# Patient Record
Sex: FEMALE | Employment: OTHER | URBAN - METROPOLITAN AREA
[De-identification: names, ages, dates, MRNs, and addresses within clinical notes are randomized per-mention and may not be internally consistent; named-entity substitution may affect disease eponyms.]

---

## 2017-01-22 NOTE — PROGRESS NOTES
[facility] Progress Note      Patient Name: Sallie Ruff   YOB: 1955  Medical Record Number: [id number]  Attending Physician: Renetta Kaur M.D. Date of Visit: 1/23/2017      Chief Complaint  Multiple myeloma - follow up. maintenance therapy with lenolidomide 5 mg daily. This dose was subsequently increased to 10 mg daily. Patient returned for unscheduled follow up in 10/2013 with new complaints of pain and swelling in the right leg.   Workup revealed DVT in right lower Rfl:         Allergies   Ms. Alexandra Mccoy is allergic to shrimp. Review of Systems   Constitutional No fevers, chills, night sweats, excessive fatigue or weight loss. Respiratory No dyspnea, cough, hemoptysis, pleuritic chest pain.   Gastrointestinal No con 01/23/17 12:40 PM   Result Value Ref Range   Immunoglobulin A 189.00 70..00 mg/dL   Immunoglobulin G 1700 (H) 791-1643 mg/dL   Immunoglobulin M 17.0 (L) 43.0-279.0 mg/dL   -MONOCLONAL PROTEIN STUDY   Collection Time: 01/23/17 12:40 PM   Result Value % 1.6 %   Immature Granulocyte % 0.3 %   -KAREN, DIRECT SCREEN   Collection Time: 01/23/17 12:40 PM   Result Value Ref Range   Karen Screen Negative Negative   -SED RATE, ANASTACIAREN (AUTOMATED)   Collection Time: 01/23/17 12:40 PM   Result Value Ref Range   Se

## 2017-01-23 ENCOUNTER — OFFICE VISIT (OUTPATIENT)
Dept: HEMATOLOGY/ONCOLOGY | Facility: HOSPITAL | Age: 62
End: 2017-01-23
Attending: SPECIALIST
Payer: COMMERCIAL

## 2017-01-23 VITALS
TEMPERATURE: 98 F | OXYGEN SATURATION: 98 % | BODY MASS INDEX: 22.02 KG/M2 | HEIGHT: 64.02 IN | SYSTOLIC BLOOD PRESSURE: 112 MMHG | WEIGHT: 129 LBS | DIASTOLIC BLOOD PRESSURE: 59 MMHG | HEART RATE: 77 BPM | RESPIRATION RATE: 18 BRPM

## 2017-01-23 DIAGNOSIS — R21 RASH: ICD-10-CM

## 2017-01-23 DIAGNOSIS — D53.9 MACROCYTIC ANEMIA: ICD-10-CM

## 2017-01-23 DIAGNOSIS — M79.89 BILATERAL HAND SWELLING: ICD-10-CM

## 2017-01-23 DIAGNOSIS — M25.541 ARTHRALGIA OF BOTH HANDS: ICD-10-CM

## 2017-01-23 DIAGNOSIS — R01.1 NEWLY RECOGNIZED HEART MURMUR: ICD-10-CM

## 2017-01-23 DIAGNOSIS — M25.542 ARTHRALGIA OF BOTH HANDS: ICD-10-CM

## 2017-01-23 DIAGNOSIS — C90.01 MULTIPLE MYELOMA IN REMISSION (HCC): Primary | ICD-10-CM

## 2017-01-23 DIAGNOSIS — I82.511 CHRONIC DEEP VEIN THROMBOSIS (DVT) OF FEMORAL VEIN OF RIGHT LOWER EXTREMITY (HCC): ICD-10-CM

## 2017-01-23 LAB
ALBUMIN SERPL-MCNC: 3.5 G/DL (ref 3.5–4.8)
ALP LIVER SERPL-CCNC: 52 U/L (ref 50–130)
ALT SERPL-CCNC: 23 U/L (ref 14–54)
AST SERPL-CCNC: 21 U/L (ref 15–41)
BASOPHILS # BLD AUTO: 0.06 X10(3) UL (ref 0–0.1)
BASOPHILS NFR BLD AUTO: 1.6 %
BILIRUB SERPL-MCNC: 1 MG/DL (ref 0.1–2)
BUN BLD-MCNC: 21 MG/DL (ref 8–20)
CALCIUM BLD-MCNC: 8.8 MG/DL (ref 8.3–10.3)
CHLORIDE: 109 MMOL/L (ref 101–111)
CO2: 26 MMOL/L (ref 22–32)
CREAT BLD-MCNC: 1.31 MG/DL (ref 0.55–1.02)
EOSINOPHIL # BLD AUTO: 0.34 X10(3) UL (ref 0–0.3)
EOSINOPHIL NFR BLD AUTO: 8.8 %
ERYTHROCYTE [DISTWIDTH] IN BLOOD BY AUTOMATED COUNT: 13.2 % (ref 11.5–16)
GLUCOSE BLD-MCNC: 91 MG/DL (ref 70–99)
HCT VFR BLD AUTO: 34.4 % (ref 34–50)
HGB BLD-MCNC: 11.5 G/DL (ref 12–16)
IMMATURE GRANULOCYTE COUNT: 0.01 X10(3) UL (ref 0–1)
IMMATURE GRANULOCYTE RATIO %: 0.3 %
IMMUNOGLOBULIN A: 189 MG/DL (ref 70–312)
IMMUNOGLOBULIN G: 1700 MG/DL (ref 791–1643)
IMMUNOGLOBULIN M: 17 MG/DL (ref 43–279)
LYMPHOCYTES # BLD AUTO: 1.95 X10(3) UL (ref 0.9–4)
LYMPHOCYTES NFR BLD AUTO: 50.6 %
M PROTEIN MFR SERPL ELPH: 7.1 G/DL (ref 6.1–8.3)
MCH RBC QN AUTO: 35.8 PG (ref 27–33.2)
MCHC RBC AUTO-ENTMCNC: 33.4 G/DL (ref 31–37)
MCV RBC AUTO: 107.2 FL (ref 81–100)
MONOCYTES # BLD AUTO: 0.47 X10(3) UL (ref 0.1–0.6)
MONOCYTES NFR BLD AUTO: 12.2 %
NEUTROPHIL ABS PRELIM: 1.02 X10 (3) UL (ref 1.3–6.7)
NEUTROPHILS # BLD AUTO: 1.02 X10(3) UL (ref 1.3–6.7)
NEUTROPHILS NFR BLD AUTO: 26.5 %
PLATELET # BLD AUTO: 161 10(3)UL (ref 150–450)
POTASSIUM SERPL-SCNC: 4.3 MMOL/L (ref 3.6–5.1)
RBC # BLD AUTO: 3.21 X10(6)UL (ref 3.8–5.1)
RED CELL DISTRIBUTION WIDTH-SD: 52.4 FL (ref 35.1–46.3)
SED RATE-ML: 35 MM/HR (ref 0–25)
SODIUM SERPL-SCNC: 141 MMOL/L (ref 136–144)
WBC # BLD AUTO: 3.9 X10(3) UL (ref 4–13)

## 2017-01-23 PROCEDURE — 99215 OFFICE O/P EST HI 40 MIN: CPT | Performed by: SPECIALIST

## 2017-01-23 NOTE — PROGRESS NOTES
Patient is here today for follow up with Nathalia Quintanilla. Patient stated pain in her hands is rated a 1 on a scale of 0-10.   Patient stated hand are swollen every morning - stopped Revlimid for one week as instructed but restarted - did not call with update - w

## 2017-01-24 LAB
A/G RATIO: 1.23
ALBUMIN, SERUM: 3.75 G/DL (ref 3.5–4.8)
ALPHA-1 GLOBULIN: 0.19 G/DL (ref 0.1–0.3)
ALPHA-2 GLOBULIN: 0.75 G/DL (ref 0.6–1)
ANA SCREEN: NEGATIVE
BETA GLOBULIN: 0.65 G/DL (ref 0.7–1.2)
GAMMA GLOBULIN: 1.47 G/DL (ref 0.6–1.6)
KAPPA FREE LIGHT CHAIN: 6.93 MG/DL (ref 0.33–1.94)
KAPPA/LAMBDA FLC RATIO: 1.93 (ref 0.26–1.65)
LAMBDA FREE LIGHT CHAIN: 3.59 MG/DL (ref 0.57–2.63)
TOTAL PROTEIN,SERUM: 6.8 G/DL (ref 6.1–8.3)

## 2017-01-26 ENCOUNTER — TELEPHONE (OUTPATIENT)
Dept: HEMATOLOGY/ONCOLOGY | Facility: HOSPITAL | Age: 62
End: 2017-01-26

## 2017-01-26 RX ORDER — METHYLPREDNISOLONE 4 MG/1
TABLET ORAL
Qty: 1 PACKAGE | Refills: 0 | Status: SHIPPED | OUTPATIENT
Start: 2017-01-26 | End: 2017-03-10

## 2017-01-26 NOTE — TELEPHONE ENCOUNTER
MD Dick Rodriguez, TITI                     Let son know that myeloma number are good. She is still in a remission. Suggest a medrol dose tash for the hands.  If he agrees, please send script to pharmacy

## 2017-01-26 NOTE — TELEPHONE ENCOUNTER
Patient son / patient would like to try Medrol dose tash  - Instructed to call next week with condition update. Prescription sent to [facility] pharmacy in [facility] and . Son stated understanding.

## 2017-01-30 ENCOUNTER — TELEPHONE (OUTPATIENT)
Dept: HEMATOLOGY/ONCOLOGY | Facility: HOSPITAL | Age: 62
End: 2017-01-30

## 2017-01-30 ENCOUNTER — OFFICE VISIT (OUTPATIENT)
Dept: NEPHROLOGY | Facility: CLINIC | Age: 62
End: 2017-01-30

## 2017-01-30 VITALS
HEART RATE: 60 BPM | SYSTOLIC BLOOD PRESSURE: 104 MMHG | DIASTOLIC BLOOD PRESSURE: 50 MMHG | WEIGHT: 125 LBS | RESPIRATION RATE: 16 BRPM | BODY MASS INDEX: 21 KG/M2

## 2017-01-30 DIAGNOSIS — I73.00 RAYNAUD'S PHENOMENON WITHOUT GANGRENE: ICD-10-CM

## 2017-01-30 DIAGNOSIS — N18.3 CKD (CHRONIC KIDNEY DISEASE), STAGE 3 (MODERATE): Primary | ICD-10-CM

## 2017-01-30 PROBLEM — M79.89 BILATERAL HAND SWELLING: Status: ACTIVE | Noted: 2017-01-30

## 2017-01-30 PROBLEM — I82.511 CHRONIC DEEP VEIN THROMBOSIS (DVT) OF FEMORAL VEIN OF RIGHT LOWER EXTREMITY (HCC): Status: ACTIVE | Noted: 2017-01-30

## 2017-01-30 PROCEDURE — 99214 OFFICE O/P EST MOD 30 MIN: CPT | Performed by: INTERNAL MEDICINE

## 2017-01-30 NOTE — PROGRESS NOTES
Nephrology Consult Note    REASON FOR CONSULT: CKD 3    ASSESSMENT/PLAN:        1) CKD 3- due to h/o IgG kappa multiple myeloma diagnosed in 2012; subsequently, she was started on dialysis and started on bortezomib, dexamethasone and lenalidomide.   Subsequ edema  Denies skin rashes/myalgias/arthralgias    PMH:  Past Medical History   Diagnosis Date   • Atrial thrombus 07/01/2012   • Unspecified essential hypertension    • Renal failure    • Multiple myeloma ([facility] [address] 2012       PSH:      Past Surgical History History:  No family history on file.      PHYSICAL EXAM:   /50 mmHg  Pulse 60  Resp 16  Wt 125 lb   Wt Readings from Last 3 Encounters:  01/30/17 : 125 lb  01/23/17 : 129 lb  08/15/16 : 132 lb 12.8 oz    General: Alert and oriented in no apparent dist

## 2017-01-30 NOTE — TELEPHONE ENCOUNTER
Pt's son said she is dong a little better. Swelling has improved. Diarrhea has stopped. She is still taking steroids. She has stopped the pantoprazole.

## 2017-01-31 ENCOUNTER — TELEPHONE (OUTPATIENT)
Dept: HEMATOLOGY/ONCOLOGY | Facility: HOSPITAL | Age: 62
End: 2017-01-31

## 2017-01-31 NOTE — TELEPHONE ENCOUNTER
Per Dr. James Pantoja: Please call patient's son. Let him know that I've been thinking about his mother's hand swelling. We should rule out vitamin D86 and folic acid deficiency. I've put the orders in. She can have them drawn at any [facility] facility at any time.

## 2017-02-01 ENCOUNTER — HOSPITAL ENCOUNTER (OUTPATIENT)
Dept: GENERAL RADIOLOGY | Age: 62
Discharge: HOME | End: 2017-02-01
Attending: FAMILY MEDICINE
Payer: COMMERCIAL

## 2017-02-01 ENCOUNTER — HOSPITAL ENCOUNTER (OUTPATIENT)
Dept: CV DIAGNOSTICS | Age: 62
Discharge: HOME | End: 2017-02-01
Attending: SPECIALIST
Payer: COMMERCIAL

## 2017-02-01 ENCOUNTER — TELEPHONE (OUTPATIENT)
Dept: HEMATOLOGY/ONCOLOGY | Facility: HOSPITAL | Age: 62
End: 2017-02-01

## 2017-02-01 ENCOUNTER — LAB ENCOUNTER (OUTPATIENT)
Dept: LAB | Age: 62
End: 2017-02-01
Attending: FAMILY MEDICINE
Payer: COMMERCIAL

## 2017-02-01 DIAGNOSIS — R01.1 NEWLY RECOGNIZED HEART MURMUR: ICD-10-CM

## 2017-02-01 DIAGNOSIS — R01.1 HEART MURMUR: ICD-10-CM

## 2017-02-01 DIAGNOSIS — M25.542 ARTHRALGIA OF BOTH HANDS: ICD-10-CM

## 2017-02-01 DIAGNOSIS — R21 RASH: ICD-10-CM

## 2017-02-01 DIAGNOSIS — C90.01 MULTIPLE MYELOMA IN REMISSION (HCC): ICD-10-CM

## 2017-02-01 DIAGNOSIS — M25.541 ARTHRALGIA OF BOTH HANDS: ICD-10-CM

## 2017-02-01 DIAGNOSIS — R01.1 SYSTOLIC MURMUR: ICD-10-CM

## 2017-02-01 DIAGNOSIS — D53.9 MACROCYTIC ANEMIA: ICD-10-CM

## 2017-02-01 DIAGNOSIS — R09.89 RALES: ICD-10-CM

## 2017-02-01 LAB
HAV AB SERPL IA-ACNC: 235 PG/ML (ref 193–986)
HCT VFR BLD AUTO: 38.2 % (ref 34–50)

## 2017-02-01 PROCEDURE — 85014 HEMATOCRIT: CPT

## 2017-02-01 PROCEDURE — 36415 COLL VENOUS BLD VENIPUNCTURE: CPT

## 2017-02-01 PROCEDURE — 93306 TTE W/DOPPLER COMPLETE: CPT

## 2017-02-01 PROCEDURE — 71020 XR CHEST PA + LAT CHEST (CPT=71020): CPT

## 2017-02-01 PROCEDURE — 93306 TTE W/DOPPLER COMPLETE: CPT | Performed by: INTERNAL MEDICINE

## 2017-02-01 PROCEDURE — 82747 ASSAY OF FOLIC ACID RBC: CPT

## 2017-02-01 PROCEDURE — 82607 VITAMIN B-12: CPT

## 2017-02-02 NOTE — TELEPHONE ENCOUNTER
Notified Vitamin studies are normal per Linwood Bai. Son notified. Son stated patient has finished Medrol dose tash. Patient stated swelling in hands slightly better. See rheumatology tomorrow. Instructed to call as needed.

## 2017-02-03 LAB — HEMATOCRIT (CLIENT SUPPLIED): 35.2 %

## 2017-02-10 ENCOUNTER — TELEPHONE (OUTPATIENT)
Dept: HEMATOLOGY/ONCOLOGY | Facility: HOSPITAL | Age: 62
End: 2017-02-10

## 2017-02-10 NOTE — TELEPHONE ENCOUNTER
Per  notified of condition updated. Notified patient son per Dr. Mitchell Carrasquillo to restart Revlimid. Son stated understanding. Patient may follow up with Dr. Mitchell Carrasquillo when she returns from [facility]. Son stated understanding.   Will relay information t

## 2017-02-20 PROBLEM — I73.00 RAYNAUD'S PHENOMENON WITHOUT GANGRENE: Status: ACTIVE | Noted: 2017-02-20

## 2017-03-10 ENCOUNTER — OFFICE VISIT (OUTPATIENT)
Dept: HEMATOLOGY/ONCOLOGY | Facility: HOSPITAL | Age: 62
End: 2017-03-10
Attending: SPECIALIST
Payer: COMMERCIAL

## 2017-03-10 VITALS
HEIGHT: 64.02 IN | RESPIRATION RATE: 18 BRPM | HEART RATE: 74 BPM | BODY MASS INDEX: 22.02 KG/M2 | TEMPERATURE: 98 F | DIASTOLIC BLOOD PRESSURE: 97 MMHG | SYSTOLIC BLOOD PRESSURE: 114 MMHG | OXYGEN SATURATION: 98 % | WEIGHT: 129 LBS

## 2017-03-10 DIAGNOSIS — I82.511 CHRONIC DEEP VEIN THROMBOSIS (DVT) OF FEMORAL VEIN OF RIGHT LOWER EXTREMITY (HCC): ICD-10-CM

## 2017-03-10 DIAGNOSIS — C90.01 MULTIPLE MYELOMA IN REMISSION (HCC): Primary | ICD-10-CM

## 2017-03-10 PROCEDURE — 99213 OFFICE O/P EST LOW 20 MIN: CPT | Performed by: SPECIALIST

## 2017-03-10 NOTE — PROGRESS NOTES
Patient is here today for follow up with [facility] Record. Patient denies pain. Is on Revlimid 5mg daily. Stated is being treated for Raynaud's disease with rheumatology. Stated intermittent diarrhea with Revlimid therapy.  Medication list, medical history and to

## 2017-03-17 NOTE — PROGRESS NOTES
[facility] Progress Note      Patient Name: Rosalio Leigh   YOB: 1955  Medical Record Number: [id number]  Attending Physician: Vivi Cortes M.D. Date of Visit: 3/10/2017      Chief Complaint  Multiple myeloma - follow up. maintenance therapy with lenolidomide 5 mg daily. This dose was subsequently increased to 10 mg daily. Patient returned for unscheduled follow up in 10/2013 with new complaints of pain and swelling in the right leg.   Workup revealed DVT in right lower as needed for Diarrhea. Disp:  Rfl:    Multiple Vitamin Oral Tab Take 1 tablet by mouth daily. Disp:  Rfl:    Glucosamine Sulfate 1000 MG Oral Cap Take 2,000 mg by mouth daily. Disp:  Rfl:         Allergies   Ms. Alfredo Chavarria is allergic to shrimp.        Review o Claude Huang M.D.   [facility]  Director, Bone and Soft Tissue Sarcoma Program  Section of Hematology/Oncology, [facility]

## 2017-07-05 ENCOUNTER — TELEPHONE (OUTPATIENT)
Dept: HEMATOLOGY/ONCOLOGY | Facility: HOSPITAL | Age: 62
End: 2017-07-05

## 2017-09-13 ENCOUNTER — APPOINTMENT (OUTPATIENT)
Dept: HEMATOLOGY/ONCOLOGY | Age: 62
End: 2017-09-13
Attending: SPECIALIST
Payer: COMMERCIAL

## 2017-09-18 ENCOUNTER — OFFICE VISIT (OUTPATIENT)
Dept: HEMATOLOGY/ONCOLOGY | Facility: HOSPITAL | Age: 62
End: 2017-09-18
Attending: SPECIALIST
Payer: COMMERCIAL

## 2017-09-18 VITALS
HEIGHT: 64.02 IN | DIASTOLIC BLOOD PRESSURE: 54 MMHG | BODY MASS INDEX: 20.83 KG/M2 | HEART RATE: 79 BPM | TEMPERATURE: 98 F | OXYGEN SATURATION: 100 % | SYSTOLIC BLOOD PRESSURE: 113 MMHG | WEIGHT: 122 LBS | RESPIRATION RATE: 18 BRPM

## 2017-09-18 DIAGNOSIS — M79.89 BILATERAL HAND SWELLING: ICD-10-CM

## 2017-09-18 DIAGNOSIS — R63.4 WEIGHT LOSS: ICD-10-CM

## 2017-09-18 DIAGNOSIS — C90.01 MULTIPLE MYELOMA IN REMISSION (HCC): Primary | ICD-10-CM

## 2017-09-18 DIAGNOSIS — R19.7 DIARRHEA, UNSPECIFIED TYPE: ICD-10-CM

## 2017-09-18 DIAGNOSIS — I73.00 RAYNAUD'S PHENOMENON WITHOUT GANGRENE: ICD-10-CM

## 2017-09-18 DIAGNOSIS — I82.511 CHRONIC DEEP VEIN THROMBOSIS (DVT) OF FEMORAL VEIN OF RIGHT LOWER EXTREMITY (HCC): ICD-10-CM

## 2017-09-18 LAB
ALBUMIN SERPL-MCNC: 3.4 G/DL (ref 3.5–4.8)
ALP LIVER SERPL-CCNC: 70 U/L (ref 50–130)
ALT SERPL-CCNC: 26 U/L (ref 14–54)
AST SERPL-CCNC: 17 U/L (ref 15–41)
BASOPHILS # BLD AUTO: 0.03 X10(3) UL (ref 0–0.1)
BASOPHILS NFR BLD AUTO: 0.5 %
BILIRUB SERPL-MCNC: 0.8 MG/DL (ref 0.1–2)
BUN BLD-MCNC: 22 MG/DL (ref 8–20)
CALCIUM BLD-MCNC: 8.8 MG/DL (ref 8.3–10.3)
CHLORIDE: 109 MMOL/L (ref 101–111)
CO2: 26 MMOL/L (ref 22–32)
CREAT BLD-MCNC: 1.3 MG/DL (ref 0.55–1.02)
EOSINOPHIL # BLD AUTO: 0.15 X10(3) UL (ref 0–0.3)
EOSINOPHIL NFR BLD AUTO: 2.5 %
ERYTHROCYTE [DISTWIDTH] IN BLOOD BY AUTOMATED COUNT: 13.8 % (ref 11.5–16)
GLUCOSE BLD-MCNC: 86 MG/DL (ref 70–99)
HCT VFR BLD AUTO: 34.5 % (ref 34–50)
HGB BLD-MCNC: 11 G/DL (ref 12–16)
IMMATURE GRANULOCYTE COUNT: 0.01 X10(3) UL (ref 0–1)
IMMATURE GRANULOCYTE RATIO %: 0.2 %
IMMUNOGLOBULIN A: 150 MG/DL (ref 70–312)
IMMUNOGLOBULIN G: 1490 MG/DL (ref 791–1643)
IMMUNOGLOBULIN M: 25.2 MG/DL (ref 43–279)
LYMPHOCYTES # BLD AUTO: 2.18 X10(3) UL (ref 0.9–4)
LYMPHOCYTES NFR BLD AUTO: 36.6 %
M PROTEIN MFR SERPL ELPH: 7.4 G/DL (ref 6.1–8.3)
MCH RBC QN AUTO: 34.2 PG (ref 27–33.2)
MCHC RBC AUTO-ENTMCNC: 31.9 G/DL (ref 31–37)
MCV RBC AUTO: 107.1 FL (ref 81–100)
MONOCYTES # BLD AUTO: 0.69 X10(3) UL (ref 0.1–0.6)
MONOCYTES NFR BLD AUTO: 11.6 %
NEUTROPHIL ABS PRELIM: 2.9 X10 (3) UL (ref 1.3–6.7)
NEUTROPHILS # BLD AUTO: 2.9 X10(3) UL (ref 1.3–6.7)
NEUTROPHILS NFR BLD AUTO: 48.6 %
PLATELET # BLD AUTO: 132 10(3)UL (ref 150–450)
POTASSIUM SERPL-SCNC: 4.6 MMOL/L (ref 3.6–5.1)
RBC # BLD AUTO: 3.22 X10(6)UL (ref 3.8–5.1)
RED CELL DISTRIBUTION WIDTH-SD: 54.4 FL (ref 35.1–46.3)
SODIUM SERPL-SCNC: 140 MMOL/L (ref 136–144)
WBC # BLD AUTO: 6 X10(3) UL (ref 4–13)

## 2017-09-18 PROCEDURE — 99215 OFFICE O/P EST HI 40 MIN: CPT | Performed by: SPECIALIST

## 2017-09-18 NOTE — PROGRESS NOTES
Patient is here today for follow up with Dr. Pro Galan for Multiple Myeloma. Patient is on Revlimid 5mg. Patient denies pain. Stated has had 7 lb weight loss since last visit - stated has been sick with the flu. Intermittent diarrhea with Revlimid therapy.

## 2017-09-21 DIAGNOSIS — C90.01 MULTIPLE MYELOMA IN REMISSION (HCC): ICD-10-CM

## 2017-09-21 DIAGNOSIS — Z12.31 ENCOUNTER FOR SCREENING MAMMOGRAM FOR BREAST CANCER: Primary | ICD-10-CM

## 2017-09-21 LAB
A/G RATIO: 1.48
ALBUMIN, SERUM: 4.3 G/DL (ref 3.5–4.8)
ALPHA-1 GLOBULIN: 0.19 G/DL (ref 0.1–0.3)
ALPHA-2 GLOBULIN: 0.76 G/DL (ref 0.6–1)
BETA GLOBULIN: 0.6 G/DL (ref 0.7–1.2)
GAMMA GLOBULIN: 1.35 G/DL (ref 0.6–1.6)
KAPPA FREE LIGHT CHAIN: 3.65 MG/DL (ref 0.33–1.94)
KAPPA/LAMBDA FLC RATIO: 1.6 (ref 0.26–1.65)
LAMBDA FREE LIGHT CHAIN: 2.29 MG/DL (ref 0.57–2.63)
M-SPIKE 1: 0.5 G/DL (ref ?–0)
TOTAL PROTEIN,SERUM: 7.2 G/DL (ref 6.1–8.3)

## 2017-09-22 ENCOUNTER — HOSPITAL ENCOUNTER (OUTPATIENT)
Dept: MAMMOGRAPHY | Age: 62
Discharge: HOME | End: 2017-09-22
Attending: SPECIALIST
Payer: COMMERCIAL

## 2017-09-22 ENCOUNTER — TELEPHONE (OUTPATIENT)
Dept: HEMATOLOGY/ONCOLOGY | Facility: HOSPITAL | Age: 62
End: 2017-09-22

## 2017-09-22 DIAGNOSIS — Z12.31 ENCOUNTER FOR SCREENING MAMMOGRAM FOR BREAST CANCER: ICD-10-CM

## 2017-09-22 DIAGNOSIS — C90.01 MULTIPLE MYELOMA IN REMISSION (HCC): ICD-10-CM

## 2017-09-22 PROCEDURE — 77063 BREAST TOMOSYNTHESIS BI: CPT | Performed by: SPECIALIST

## 2017-09-22 PROCEDURE — 77067 SCR MAMMO BI INCL CAD: CPT | Performed by: SPECIALIST

## 2017-09-22 NOTE — TELEPHONE ENCOUNTER
Randi Raw, MD Thalia Apgar, RN             Let her son know that her labs are all fine except for slight increase in her myeloma M protein. When is she going back to [facility]? Based upon that I can decide on follow up.       Left detailed mess

## 2017-09-25 ENCOUNTER — LAB ENCOUNTER (OUTPATIENT)
Dept: LAB | Age: 62
End: 2017-09-25
Attending: INTERNAL MEDICINE
Payer: COMMERCIAL

## 2017-09-25 DIAGNOSIS — R19.7 DIARRHEA, UNSPECIFIED TYPE: ICD-10-CM

## 2017-09-25 LAB
C-REACTIVE PROTEIN: <0.29 MG/DL (ref ?–1)
IMMUNOGLOBULIN A: 147 MG/DL (ref 70–312)
SED RATE-ML: 39 MM/HR (ref 0–25)
TSI SER-ACNC: 2.81 MIU/ML (ref 0.35–5.5)

## 2017-09-25 PROCEDURE — 84443 ASSAY THYROID STIM HORMONE: CPT

## 2017-09-25 PROCEDURE — 82784 ASSAY IGA/IGD/IGG/IGM EACH: CPT

## 2017-09-25 PROCEDURE — 86255 FLUORESCENT ANTIBODY SCREEN: CPT

## 2017-09-25 PROCEDURE — 36415 COLL VENOUS BLD VENIPUNCTURE: CPT

## 2017-09-25 PROCEDURE — 83516 IMMUNOASSAY NONANTIBODY: CPT

## 2017-09-25 PROCEDURE — 85652 RBC SED RATE AUTOMATED: CPT

## 2017-09-25 PROCEDURE — 86140 C-REACTIVE PROTEIN: CPT

## 2017-09-26 ENCOUNTER — LAB ENCOUNTER (OUTPATIENT)
Dept: LAB | Age: 62
End: 2017-09-26
Attending: INTERNAL MEDICINE
Payer: COMMERCIAL

## 2017-09-26 DIAGNOSIS — R19.7 DIARRHEA, UNSPECIFIED TYPE: ICD-10-CM

## 2017-09-26 PROCEDURE — 87045 FECES CULTURE AEROBIC BACT: CPT

## 2017-09-26 PROCEDURE — 87046 STOOL CULTR AEROBIC BACT EA: CPT

## 2017-09-26 PROCEDURE — 87177 OVA AND PARASITES SMEARS: CPT

## 2017-09-26 PROCEDURE — 87427 SHIGA-LIKE TOXIN AG IA: CPT

## 2017-09-26 PROCEDURE — 87209 SMEAR COMPLEX STAIN: CPT

## 2017-09-26 PROCEDURE — 87493 C DIFF AMPLIFIED PROBE: CPT

## 2017-09-27 LAB
CRYPTOSP AG STL QL IA: NEGATIVE
G LAMBLIA AG STL QL IA: NEGATIVE

## 2017-09-27 PROCEDURE — 87272 CRYPTOSPORIDIUM AG IF: CPT

## 2017-09-27 PROCEDURE — 84302 ASSAY OF SWEAT SODIUM: CPT

## 2017-09-27 PROCEDURE — 84999 UNLISTED CHEMISTRY PROCEDURE: CPT

## 2017-09-27 PROCEDURE — 82705 FATS/LIPIDS FECES QUAL: CPT

## 2017-09-27 PROCEDURE — 87329 GIARDIA AG IA: CPT

## 2017-09-27 PROCEDURE — 82656 EL-1 FECAL QUAL/SEMIQ: CPT

## 2017-09-27 NOTE — TELEPHONE ENCOUNTER
Spoke with son Светлана Meza. Patient is going back to [facility] next week. She will follow up with Oncologist in [place]. But would like to see you when she comes to [facility].

## 2017-09-28 LAB
GLIAD (DEAMIDATED) AB, IGA: NEGATIVE
GLIAD (DEAMIDATED) AB, IGG: NEGATIVE
TISSUE TRANSGLUTAMINASE AB,IGA: <1.2 U/ML (ref ?–15)
TISSUE TRANSGLUTAMINASE IGA QUALITATIVE: NEGATIVE

## 2017-09-29 LAB
NEUTRAL FAT, FECAL: NORMAL
POTASSIUM, FECAL: 42 MMOL/L
SODIUM, FECAL: 90 MMOL/L
SPLIT FAT, FECAL: NORMAL

## 2017-09-29 NOTE — PROGRESS NOTES
[facility] Progress Note      Patient Name: Huyen Jonas   YOB: 1955  Medical Record Number: [id number]  Attending Physician: Aminata Rod M.D. Date of Visit: 9/18/2017      Chief Complaint  Multiple myeloma - follow up. maintenance therapy with lenolidomide 5 mg daily. This dose was subsequently increased to 10 mg daily. Patient returned for unscheduled follow up in 10/2013 with new complaints of pain and swelling in the right leg.   Workup revealed DVT in right lower Rivaroxaban (XARELTO) 20 MG Oral Tab Take 1 tablet (20 mg total) by mouth daily. Disp: 90 tablet Rfl: 3   Calcium Carb-Cholecalciferol (CALCIUM 1000 + D OR) Take  by mouth.  Disp:  Rfl:    Loperamide HCl (IMODIUM) 2 MG Oral Cap Take 2 mg by mouth 4 (four) hour(s))  -COMP METABOLIC PANEL (14)   Collection Time: 09/18/17 11:42 AM   Result Value Ref Range   Glucose 86 70 - 99 mg/dL   BUN 22 (H) 8 - 20 mg/dL   Creatinine 1.30 (H) 0.55 - 1.02 mg/dL   GFR 43 (L) >=60   Calcium, Total 8.8 8.3 - 10.3 mg/dL   Alkali (L) 150.0 - 450.0 10(3)uL   .1 (H) 81.0 - 100.0 fL   MCH 34.2 (H) 27.0 - 33.2 pg   MCHC 31.9 31.0 - 37.0 g/dL   RDW 13.8 11.5 - 16.0 %   RDW-SD 54.4 (H) 35.1 - 46.3 fL   Neutrophil Absolute Prelim 2.90 1.30 - 6.70 x10 (3) uL   Neutrophil Absolute 2. Negative for Toxigenic C. difficile Negative   -GIARDIA + CRYPTO ANTIGEN, STOOL   Collection Time: 09/27/17  9:36 AM   Result Value Ref Range   Giardia Antigen Stool Negative Negative   Cryptosporidium Antigen Negative Negative       Impression and Plan

## 2017-09-30 LAB — PANCREATIC ELASTASE , FECAL: 347 UG/G

## 2017-10-16 DIAGNOSIS — C90.01 MULTIPLE MYELOMA IN REMISSION (HCC): ICD-10-CM

## 2017-10-17 RX ORDER — LENALIDOMIDE 5 MG/1
CAPSULE ORAL
Qty: 28 CAPSULE | Refills: 0 | OUTPATIENT
Start: 2017-10-17

## 2017-10-24 NOTE — TELEPHONE ENCOUNTER
Patient is in [facility] now. Will have labs drawn in [facility]. Will fax labs to our office. Will follow up here at [facility] end of Feb beginning of March.

## 2018-01-31 NOTE — PROGRESS NOTES
[facility] Progress Note      Patient Name: Joaquim Talbot   YOB: 1955  Medical Record Number: [id number]  Attending Physician: Cordell Mccauley M.D. Date of Visit: 2/1/2018      Chief Complaint  Multiple myeloma - follow up. maintenance therapy with lenolidomide 5 mg daily. This dose was subsequently increased to 10 mg daily. Patient returned for unscheduled follow up in 10/2013 with new complaints of pain and swelling in the right leg.   Workup revealed DVT in right lower Rfl:    ondansetron (ZOFRAN-ODT) 8 MG Oral Tablet Dispersible Take 8 mg by mouth every 8 (eight) hours as needed for Nausea. Disp:  Rfl:    Loperamide HCl (IMODIUM) 2 MG Oral Cap Take 2 mg by mouth 4 (four) times daily as needed for Diarrhea.  Disp:  Rfl: Glucose 85 70 - 99 mg/dL   BUN 16 8 - 20 mg/dL   Creatinine 1.36 (H) 0.55 - 1.02 mg/dL   GFR 41 (L) >=60   Calcium, Total 8.6 8.3 - 10.3 mg/dL   Alkaline Phosphatase 39 (L) 50 - 130 U/L   AST 14 (L) 15 - 41 U/L   Alt 16 14 - 54 U/L   Bilirubin, Total 0.9 without modification. 2. DVT: Continue rivaroxaban indefinitely. 3.   Raynaud's syndrome: On sildenafil and following with rheumatology.      4.   Lower extremity edema: Suggest the use of compression stockings and/or discussing with primary care geetha

## 2018-02-01 ENCOUNTER — OFFICE VISIT (OUTPATIENT)
Dept: HEMATOLOGY/ONCOLOGY | Facility: HOSPITAL | Age: 63
End: 2018-02-01
Attending: SPECIALIST
Payer: COMMERCIAL

## 2018-02-01 VITALS
DIASTOLIC BLOOD PRESSURE: 57 MMHG | BODY MASS INDEX: 21.92 KG/M2 | HEIGHT: 64.02 IN | TEMPERATURE: 97 F | RESPIRATION RATE: 16 BRPM | WEIGHT: 128.38 LBS | SYSTOLIC BLOOD PRESSURE: 127 MMHG | HEART RATE: 73 BPM | OXYGEN SATURATION: 100 %

## 2018-02-01 DIAGNOSIS — R60.0 LOWER EXTREMITY EDEMA: ICD-10-CM

## 2018-02-01 DIAGNOSIS — I82.511 CHRONIC DEEP VEIN THROMBOSIS (DVT) OF FEMORAL VEIN OF RIGHT LOWER EXTREMITY (HCC): ICD-10-CM

## 2018-02-01 DIAGNOSIS — I73.00 RAYNAUD'S PHENOMENON WITHOUT GANGRENE: ICD-10-CM

## 2018-02-01 DIAGNOSIS — C90.01 MULTIPLE MYELOMA IN REMISSION (HCC): Primary | ICD-10-CM

## 2018-02-01 LAB
ALBUMIN SERPL-MCNC: 3.4 G/DL (ref 3.5–4.8)
ALP LIVER SERPL-CCNC: 39 U/L (ref 50–130)
ALT SERPL-CCNC: 16 U/L (ref 14–54)
AST SERPL-CCNC: 14 U/L (ref 15–41)
BASOPHILS # BLD AUTO: 0.03 X10(3) UL (ref 0–0.1)
BASOPHILS NFR BLD AUTO: 1 %
BILIRUB SERPL-MCNC: 0.9 MG/DL (ref 0.1–2)
BUN BLD-MCNC: 16 MG/DL (ref 8–20)
CALCIUM BLD-MCNC: 8.6 MG/DL (ref 8.3–10.3)
CHLORIDE: 108 MMOL/L (ref 101–111)
CO2: 28 MMOL/L (ref 22–32)
CREAT BLD-MCNC: 1.36 MG/DL (ref 0.55–1.02)
EOSINOPHIL # BLD AUTO: 0.16 X10(3) UL (ref 0–0.3)
EOSINOPHIL NFR BLD AUTO: 5.2 %
ERYTHROCYTE [DISTWIDTH] IN BLOOD BY AUTOMATED COUNT: 15.7 % (ref 11.5–16)
GLUCOSE BLD-MCNC: 85 MG/DL (ref 70–99)
HCT VFR BLD AUTO: 33 % (ref 34–50)
HGB BLD-MCNC: 10.7 G/DL (ref 12–16)
IMMATURE GRANULOCYTE COUNT: 0.01 X10(3) UL (ref 0–1)
IMMATURE GRANULOCYTE RATIO %: 0.3 %
IMMUNOGLOBULIN A: 169 MG/DL (ref 70–312)
IMMUNOGLOBULIN G: 1510 MG/DL (ref 791–1643)
IMMUNOGLOBULIN M: 33.4 MG/DL (ref 43–279)
LYMPHOCYTES # BLD AUTO: 1.77 X10(3) UL (ref 0.9–4)
LYMPHOCYTES NFR BLD AUTO: 58 %
M PROTEIN MFR SERPL ELPH: 7 G/DL (ref 6.1–8.3)
MCH RBC QN AUTO: 34 PG (ref 27–33.2)
MCHC RBC AUTO-ENTMCNC: 32.4 G/DL (ref 31–37)
MCV RBC AUTO: 104.8 FL (ref 81–100)
MONOCYTES # BLD AUTO: 0.38 X10(3) UL (ref 0.1–0.6)
MONOCYTES NFR BLD AUTO: 12.5 %
NEUTROPHIL ABS PRELIM: 0.7 X10 (3) UL (ref 1.3–6.7)
NEUTROPHILS # BLD AUTO: 0.7 X10(3) UL (ref 1.3–6.7)
NEUTROPHILS NFR BLD AUTO: 23 %
PLATELET # BLD AUTO: 168 10(3)UL (ref 150–450)
POTASSIUM SERPL-SCNC: 3.9 MMOL/L (ref 3.6–5.1)
RBC # BLD AUTO: 3.15 X10(6)UL (ref 3.8–5.1)
RED CELL DISTRIBUTION WIDTH-SD: 60 FL (ref 35.1–46.3)
SODIUM SERPL-SCNC: 141 MMOL/L (ref 136–144)
WBC # BLD AUTO: 3.1 X10(3) UL (ref 4–13)

## 2018-02-01 PROCEDURE — 99215 OFFICE O/P EST HI 40 MIN: CPT | Performed by: SPECIALIST

## 2018-02-01 RX ORDER — SILDENAFIL CITRATE 20 MG/1
20 TABLET ORAL 3 TIMES DAILY
Status: ON HOLD | COMMUNITY
End: 2021-05-17

## 2018-02-01 NOTE — PROGRESS NOTES
Patient is here today for follow up with Danny Odell for Multiple Myeloma. Patient is on Revlimid 5mg daily. Denies adverse side effects from Revlimid therapy. Stated pain Right medial leg ankle to mid calf.  Medication list, medical history were reviewed an

## 2018-02-07 LAB
A/G RATIO: 1.44
ALBUMIN, SERUM: 4.07 G/DL (ref 3.5–4.8)
ALPHA-1 GLOBULIN: 0.21 G/DL (ref 0.1–0.3)
ALPHA-2 GLOBULIN: 0.74 G/DL (ref 0.6–1)
BETA GLOBULIN: 0.61 G/DL (ref 0.7–1.2)
GAMMA GLOBULIN: 1.28 G/DL (ref 0.6–1.6)
KAPPA FREE LIGHT CHAIN: 6.71 MG/DL (ref 0.33–1.94)
KAPPA/LAMBDA FLC RATIO: 2.13 (ref 0.26–1.65)
LAMBDA FREE LIGHT CHAIN: 3.16 MG/DL (ref 0.57–2.63)
TOTAL PROTEIN,SERUM: 6.9 G/DL (ref 6.1–8.3)

## 2018-02-08 ENCOUNTER — TELEPHONE (OUTPATIENT)
Dept: HEMATOLOGY/ONCOLOGY | Facility: HOSPITAL | Age: 63
End: 2018-02-08

## 2018-02-08 DIAGNOSIS — C90.01 MULTIPLE MYELOMA IN REMISSION (HCC): ICD-10-CM

## 2018-02-08 NOTE — TELEPHONE ENCOUNTER
MD Donald Cummings RN             Let son know that she remains in remission. I'll see her when she's next back in the [place] Patient son Perez Mayes also requested a refill on the patients Revlimid. OK for this. Per Jesus Lang.  Pre

## 2018-03-16 DIAGNOSIS — C90.01 MULTIPLE MYELOMA IN REMISSION (HCC): ICD-10-CM

## 2018-03-16 RX ORDER — LENALIDOMIDE 5 MG/1
CAPSULE ORAL
Qty: 28 CAPSULE | Refills: 0 | OUTPATIENT
Start: 2018-03-16

## 2018-04-20 ENCOUNTER — TELEPHONE (OUTPATIENT)
Dept: HEMATOLOGY/ONCOLOGY | Facility: HOSPITAL | Age: 63
End: 2018-04-20

## 2018-04-20 DIAGNOSIS — C90.01 MULTIPLE MYELOMA IN REMISSION (HCC): ICD-10-CM

## 2018-05-18 DIAGNOSIS — C90.01 MULTIPLE MYELOMA IN REMISSION (HCC): ICD-10-CM

## 2018-06-13 DIAGNOSIS — C90.01 MULTIPLE MYELOMA IN REMISSION (HCC): ICD-10-CM

## 2018-06-14 ENCOUNTER — TELEPHONE (OUTPATIENT)
Dept: HEMATOLOGY/ONCOLOGY | Facility: HOSPITAL | Age: 63
End: 2018-06-14

## 2018-06-14 DIAGNOSIS — M81.0 OSTEOPOROSIS: ICD-10-CM

## 2018-06-14 DIAGNOSIS — C90.00 MULTIPLE MYELOMA (HCC): ICD-10-CM

## 2018-07-12 ENCOUNTER — TELEPHONE (OUTPATIENT)
Dept: HEMATOLOGY/ONCOLOGY | Facility: HOSPITAL | Age: 63
End: 2018-07-12

## 2018-07-12 DIAGNOSIS — C90.01 MULTIPLE MYELOMA IN REMISSION (HCC): ICD-10-CM

## 2018-08-08 DIAGNOSIS — C90.01 MULTIPLE MYELOMA IN REMISSION (HCC): ICD-10-CM

## 2018-08-13 ENCOUNTER — TELEPHONE (OUTPATIENT)
Dept: HEMATOLOGY/ONCOLOGY | Facility: HOSPITAL | Age: 63
End: 2018-08-13

## 2018-08-13 NOTE — TELEPHONE ENCOUNTER
Per Reza Branham patient will need to go to a [address] [place] [address]. . May need to go thru the ER. Patient son confirmed patients Bone Marrow Transplant was at [facility] with Ulisses Singh in 2012.  Stated he would bring her to  of

## 2020-01-30 ENCOUNTER — ORDER TRANSCRIPTION (OUTPATIENT)
Dept: PHYSICAL THERAPY | Facility: HOSPITAL | Age: 65
End: 2020-01-30

## 2020-01-30 DIAGNOSIS — M25.641 STIFFNESS OF JOINTS OF BOTH HANDS: ICD-10-CM

## 2020-01-30 DIAGNOSIS — R53.1 GENERALIZED WEAKNESS: Primary | ICD-10-CM

## 2020-01-30 DIAGNOSIS — M25.642 STIFFNESS OF JOINTS OF BOTH HANDS: ICD-10-CM

## 2021-02-06 DIAGNOSIS — Z23 NEED FOR VACCINATION: ICD-10-CM

## 2021-05-16 ENCOUNTER — APPOINTMENT (OUTPATIENT)
Dept: GENERAL RADIOLOGY | Facility: HOSPITAL | Age: 66
End: 2021-05-16
Attending: EMERGENCY MEDICINE
Payer: MEDICARE

## 2021-05-16 ENCOUNTER — HOSPITAL ENCOUNTER (OUTPATIENT)
Facility: HOSPITAL | Age: 66
Setting detail: OBSERVATION
Discharge: HOME HEALTH | End: 2021-05-19
Attending: EMERGENCY MEDICINE
Payer: MEDICARE

## 2021-05-16 DIAGNOSIS — R50.9 FEVER, UNSPECIFIED FEVER CAUSE: Primary | ICD-10-CM

## 2021-05-16 DIAGNOSIS — R18.8 OTHER ASCITES: ICD-10-CM

## 2021-05-16 DIAGNOSIS — N39.0 URINARY TRACT INFECTION WITHOUT HEMATURIA, SITE UNSPECIFIED: ICD-10-CM

## 2021-05-16 PROCEDURE — 71045 X-RAY EXAM CHEST 1 VIEW: CPT | Performed by: EMERGENCY MEDICINE

## 2021-05-16 RX ORDER — SPIRONOLACTONE 50 MG/1
50 TABLET, FILM COATED ORAL DAILY
COMMUNITY

## 2021-05-16 RX ORDER — ACYCLOVIR 400 MG/1
400 TABLET ORAL 2 TIMES DAILY
COMMUNITY

## 2021-05-16 RX ORDER — GABAPENTIN 100 MG/1
200 CAPSULE ORAL NIGHTLY
COMMUNITY

## 2021-05-16 RX ORDER — VENETOCLAX 100 MG/1
200 TABLET, FILM COATED ORAL
Status: ON HOLD | COMMUNITY
End: 2021-05-19

## 2021-05-16 RX ORDER — URSODIOL 300 MG/1
300 CAPSULE ORAL 2 TIMES DAILY
COMMUNITY

## 2021-05-16 RX ORDER — OMEPRAZOLE 40 MG/1
40 CAPSULE, DELAYED RELEASE ORAL DAILY
COMMUNITY

## 2021-05-16 RX ORDER — SULFAMETHOXAZOLE AND TRIMETHOPRIM 800; 160 MG/1; MG/1
1 TABLET ORAL 2 TIMES DAILY
COMMUNITY

## 2021-05-17 ENCOUNTER — APPOINTMENT (OUTPATIENT)
Dept: ULTRASOUND IMAGING | Facility: HOSPITAL | Age: 66
End: 2021-05-17
Attending: EMERGENCY MEDICINE
Payer: MEDICARE

## 2021-05-17 PROBLEM — R18.8 OTHER ASCITES: Status: ACTIVE | Noted: 2021-05-17

## 2021-05-17 PROBLEM — R50.9 FEVER, UNSPECIFIED FEVER CAUSE: Status: ACTIVE | Noted: 2021-05-17

## 2021-05-17 PROBLEM — R50.9 FEVER: Status: ACTIVE | Noted: 2021-05-17

## 2021-05-17 PROBLEM — N39.0 URINARY TRACT INFECTION WITHOUT HEMATURIA, SITE UNSPECIFIED: Status: ACTIVE | Noted: 2021-05-17

## 2021-05-17 PROCEDURE — 99220 INITIAL OBSERVATION CARE,LEVL III: CPT | Performed by: INTERNAL MEDICINE

## 2021-05-17 PROCEDURE — 0W9G3ZZ DRAINAGE OF PERITONEAL CAVITY, PERCUTANEOUS APPROACH: ICD-10-PCS | Performed by: RADIOLOGY

## 2021-05-17 PROCEDURE — 49083 ABD PARACENTESIS W/IMAGING: CPT | Performed by: EMERGENCY MEDICINE

## 2021-05-17 RX ORDER — MELATONIN
3 NIGHTLY PRN
Status: DISCONTINUED | OUTPATIENT
Start: 2021-05-17 | End: 2021-05-19

## 2021-05-17 RX ORDER — ONDANSETRON 2 MG/ML
4 INJECTION INTRAMUSCULAR; INTRAVENOUS EVERY 6 HOURS PRN
Status: DISCONTINUED | OUTPATIENT
Start: 2021-05-17 | End: 2021-05-19

## 2021-05-17 RX ORDER — ACYCLOVIR 400 MG/1
400 TABLET ORAL 2 TIMES DAILY
Status: DISCONTINUED | OUTPATIENT
Start: 2021-05-17 | End: 2021-05-19

## 2021-05-17 RX ORDER — ENOXAPARIN SODIUM 100 MG/ML
40 INJECTION SUBCUTANEOUS DAILY
Status: DISCONTINUED | OUTPATIENT
Start: 2021-05-17 | End: 2021-05-19

## 2021-05-17 RX ORDER — GABAPENTIN 100 MG/1
200 CAPSULE ORAL NIGHTLY
Status: DISCONTINUED | OUTPATIENT
Start: 2021-05-17 | End: 2021-05-19

## 2021-05-17 RX ORDER — POLYETHYLENE GLYCOL 3350 17 G/17G
17 POWDER, FOR SOLUTION ORAL DAILY PRN
Status: DISCONTINUED | OUTPATIENT
Start: 2021-05-17 | End: 2021-05-19

## 2021-05-17 RX ORDER — MORPHINE SULFATE 2 MG/ML
2 INJECTION, SOLUTION INTRAMUSCULAR; INTRAVENOUS ONCE
Status: COMPLETED | OUTPATIENT
Start: 2021-05-17 | End: 2021-05-17

## 2021-05-17 RX ORDER — PANTOPRAZOLE SODIUM 40 MG/1
40 TABLET, DELAYED RELEASE ORAL
Status: DISCONTINUED | OUTPATIENT
Start: 2021-05-17 | End: 2021-05-19

## 2021-05-17 RX ORDER — ACETAMINOPHEN 325 MG/1
650 TABLET ORAL EVERY 6 HOURS PRN
Status: DISCONTINUED | OUTPATIENT
Start: 2021-05-17 | End: 2021-05-19

## 2021-05-17 RX ORDER — SULFAMETHOXAZOLE AND TRIMETHOPRIM 800; 160 MG/1; MG/1
1 TABLET ORAL 2 TIMES DAILY
Status: DISCONTINUED | OUTPATIENT
Start: 2021-05-17 | End: 2021-05-19

## 2021-05-17 RX ORDER — URSODIOL 300 MG/1
300 CAPSULE ORAL 2 TIMES DAILY
Status: DISCONTINUED | OUTPATIENT
Start: 2021-05-17 | End: 2021-05-19

## 2021-05-17 RX ORDER — SODIUM PHOSPHATE, DIBASIC AND SODIUM PHOSPHATE, MONOBASIC 7; 19 G/133ML; G/133ML
1 ENEMA RECTAL ONCE AS NEEDED
Status: DISCONTINUED | OUTPATIENT
Start: 2021-05-17 | End: 2021-05-19

## 2021-05-17 RX ORDER — BISACODYL 10 MG
10 SUPPOSITORY, RECTAL RECTAL
Status: DISCONTINUED | OUTPATIENT
Start: 2021-05-17 | End: 2021-05-19

## 2021-05-17 NOTE — IMAGING NOTE
Grisel Retana Rn on the floor, clear liquids for 4 hours prior to procedure, stat PT/INR this am, no solids for 4 hours, Lovenox on hold.

## 2021-05-17 NOTE — ED QUICK NOTES
Pt son Catheryn Ganser leaving at this time.  Phone number on chart, call him anytime with questions or concerns

## 2021-05-17 NOTE — PLAN OF CARE
Pt. Admitted on 05-17-21 for fever, ascites and UTI. A&O x 4, mostly Trinidadian-speaking. Pain level is well controlled. Ambulates independently. VS remain stable. NPO with ice chips. Voiding freely. Last BM on 05-16-21.  SCD's on, Call Don't Fall protocol r

## 2021-05-17 NOTE — PLAN OF CARE
Pt A&O. On room air. Tolerating clear liquid diet. Last BM 5/16/21. Voiding w/o difficulty. Declining need for pain medications at this time. Up independently, however, pt reminded to \"call; don't fall\" for safety.  Pt plans to have paracentesis today in

## 2021-05-17 NOTE — CONSULTS
[facility]                       Gastroenterology Consultation-[facility]    Jatinder Alegria Patient Status:  Observation    2/10/1955 MRN [id number]   [facility] 3SW-A Attending Giuliana Martinez MD   Hosp Day # 0 PCP MONIKA SANCHEZ Pt reports improved pain since admission, requiring Morphine x 1, with Tmax 100.2 since starting Rocephin.    PMHx:   Past Medical History:   Diagnosis Date   • Atrial thrombus 07/01/2012   • Multiple myeloma ([facility] [address] 2012   • Raynaud disease    • Renal failur 300 mg, 300 mg, Oral, BID  Venetoclax TABS 200 mg, 200 mg, Oral, Daily  [COMPLETED] sodium chloride 0.9% IV bolus 1,000 mL, 1,000 mL, Intravenous, Once      Allergies:   Shrimp                  HIVES, SWELLING, SHORTNESS OF                            BREAT are anicteric  Neck:  Supple without nuchal rigidity  CV: Regular rate and rhythm, with normal S1 and S2  Resp: Clear to auscultation bilaterally without wheezes; rubs, rhonchi, or rales  Abdomen: Soft, mild diffuse tenderness, distended with the presence  Mild bibasilar atelectasis.  Mild blunting left costophrenic angle.  No measurable pneumothorax.       Impression   CONCLUSION:  Mild bibasilar atelectasis.       Dictated by (CST): Santosh Bernstein MD on 5/16/2021 at 11:49 PM       Finalized by (CST): [age] yo F w/ho MM s/p SCT, cryptogenic cirrhosis presenting with fevers and abdominal pain. Pt followed at [facility] and usually on aldactone/lasix. UA with possible UTI.      Assessment and Plan:  - pt's sx are concerning for possible SBP; if negative, c

## 2021-05-17 NOTE — ED INITIAL ASSESSMENT (HPI)
Per pts son, pt had temp of 102.7, c/o chills and water retention in her abdomen. Pt c/o generalized abd pain, denies nausea/diarrhea.

## 2021-05-17 NOTE — H&P
[facility]  History and Physical     Patricia Verdin Patient Status:  Emergency    2/10/1955 MRN [id number]   Location [address] Attending Jarrod Villa MD   Hosp Day # 0 [facility]     Chief Complaint: abdominal Shrimp                  HIVES, SWELLING, SHORTNESS OF                            BREATH    Medications:  No current facility-administered medications on file prior to encounter. Omeprazole 40 MG Oral Capsule Delayed Release, Take 40 mg by mouth daily. , Sulfate 1000 MG Oral Cap, Take 2,000 mg by mouth daily. (Patient not taking: Reported on 5/16/2021 ), Disp: , Rfl:         Review of Systems:   A comprehensive 14 point review of systems was completed. Pertinent positives and negatives noted in the HPI. last 168 hours. Inflammatory Markers  No results for input(s): CRP, LING, LDH, DDIMER in the last 168 hours. Imaging: Imaging data reviewed in Epic. ASSESSMENT / PLAN:     1. Abdominal distention and fevers concerning for SBP  1.  Empiric ceftriax

## 2021-05-17 NOTE — ED PROVIDER NOTES
Patient Seen in: [facility] Emergency Department      History   Patient presents with:  Abdomen/Flank Pain    Stated Complaint: abd pain swelling     HPI/Subjective:   HPI    69-year-old woman with a history of B-ALL chemo last chemo 2 months ago at [facility]   SpO2 95 %   O2 Device None (Room air)       Current:/65   Pulse 91   Temp 99.6 °F (37.6 °C) (Temporal)   Resp 26   Ht 160 cm (5' 3\")   Wt 55.3 kg   SpO2 100%   BMI 21.61 kg/m²         Physical Exam  Vitals and nursing note revi TIME (PT) - Abnormal; Notable for the following components:    PT 15.0 (*)     INR 1.14 (*)     All other components within normal limits   CBC W/ DIFFERENTIAL - Abnormal; Notable for the following components:    WBC 3.8 (*)     RBC 3.34 (*)     PLT 96.0 ( EKG Report. Rate: 101  Rhythm: Sinus Rhythm  Reading: Sinus tachycardia with PACs. MDM         Patient was brought back to the examination room and examined immediately due to patient's complex condition.   The patient was then plac

## 2021-05-17 NOTE — PROGRESS NOTES
Pt's home med Venetoclax ordered in house. Per pt's son, Luis Tejada, that med is currently on hold. Informed Dr Alisha Langford during rounds and asked her to dc/hold med order.

## 2021-05-18 PROCEDURE — 99225 SUBSEQUENT OBSERVATION CARE: CPT | Performed by: STUDENT IN AN ORGANIZED HEALTH CARE EDUCATION/TRAINING PROGRAM

## 2021-05-18 RX ORDER — FUROSEMIDE 40 MG/1
40 TABLET ORAL DAILY
Status: DISCONTINUED | OUTPATIENT
Start: 2021-05-18 | End: 2021-05-18

## 2021-05-18 RX ORDER — SPIRONOLACTONE 25 MG/1
50 TABLET ORAL DAILY
Status: DISCONTINUED | OUTPATIENT
Start: 2021-05-18 | End: 2021-05-19

## 2021-05-18 RX ORDER — FUROSEMIDE 20 MG/1
20 TABLET ORAL DAILY
Status: DISCONTINUED | OUTPATIENT
Start: 2021-05-18 | End: 2021-05-19

## 2021-05-18 NOTE — PLAN OF CARE
A&O x4. VSS on RA. Denies pain. Ambulating ad shayna. Voiding freely. LBM 5/17/21. Gauze and tegaderm to paracentesis site CDI. IV Rocephin q24. Right chest triple lumen flushed with blood return noted.  Plan to continue aldactone and lasix and await UA sensit

## 2021-05-18 NOTE — CM/SW NOTE
Received confirmation that HRS [facility] can resume [facility] RN services at [place]. Updated pt/son and provided them with AIDIN quality data for HRS. / to remain available for support and/or discharge planning.      Alberto Xiao, MARILYNW  Dis

## 2021-05-18 NOTE — PROGRESS NOTES
[facility]  Progress Note     Evins Files Patient Status:  Observation    2/10/1955 MRN [id number]   [facility] 3SW-A Attending Marni Mast MD   Hosp Day # 0 [facility]     Chief Complaint: Abdominal pain    S: Patient repo 05/16/21  2320 05/17/21  1100   PTP 15.0* 15.9*   INR 1.14* 1.23*            COVID-19 Lab Results    COVID-19  Lab Results   Component Value Date    COVID19 Not Detected 05/16/2021       Pro-Calcitonin  No results for input(s): PCT in the last 168 hours. Ms. Gilberto Multani is expected to be discharge to: home    Plan of care discussed with RN, GI, pt, pt jacky Hall MD          **Certification      PHYSICIAN Certification of Need for Inpatient Hospitalization - Initial Certification    Patient will require

## 2021-05-18 NOTE — PROGRESS NOTES
[facility]  Progress Note     Juju Chowdhury Patient Status:  Observation    2/10/1955 MRN [id number]   [facility] 3SW-A Attending Vicky Will MD   Hosp Day # 0 PCP [facility]     Chief Complaint: UTI    S: Patient denies abdomina COVID-19 Lab Results    COVID-19  Lab Results   Component Value Date    COVID19 Not Detected 05/16/2021       Pro-Calcitonin  No results for input(s): PCT in the last 168 hours.     Cardiac  Recent Labs   Lab 05/16/21  2320   TROP <0.045       Creatinin patient and RN. Attempted to call son, no answer.     Boogie Pickett MD          **Certification      PHYSICIAN Certification of Need for Inpatient Hospitalization - Initial Certification    Patient will require inpatient services that will reasonably be ex

## 2021-05-18 NOTE — CONSULTS
[facility] Gastroenterology Progress Note    CC: ascites, cirrhosis    S: Pt with improved abd pain today, but still with some distention     O: /63 (BP Location: Right arm)   Pulse 68   Temp 97.9 °F (36.6 °C) (Oral)   Resp 18   Ht 5' 3\" (1.6 m

## 2021-05-18 NOTE — PLAN OF CARE
PT AOX4 this PM. VSS on RA. , IS. Lovenox for DVT prophylaxis. Tolerating low sodium diet. No nausea tonight. No complaints of pain. Drainage site CDI. Triple lumen catheter SL. Flushed, capped. Unit draw. On IV ABX.  Up standby assist to bathroom, voidi

## 2021-05-19 VITALS
HEART RATE: 75 BPM | OXYGEN SATURATION: 100 % | DIASTOLIC BLOOD PRESSURE: 48 MMHG | HEIGHT: 63 IN | TEMPERATURE: 98 F | BODY MASS INDEX: 21.62 KG/M2 | WEIGHT: 122 LBS | RESPIRATION RATE: 18 BRPM | SYSTOLIC BLOOD PRESSURE: 92 MMHG

## 2021-05-19 PROCEDURE — 99217 OBSERVATION CARE DISCHARGE: CPT | Performed by: HOSPITALIST

## 2021-05-19 RX ORDER — CEPHALEXIN 500 MG/1
500 CAPSULE ORAL 4 TIMES DAILY
Qty: 28 CAPSULE | Refills: 0 | Status: SHIPPED | OUTPATIENT
Start: 2021-05-19 | End: 2021-05-26

## 2021-05-19 RX ORDER — FUROSEMIDE 20 MG/1
20 TABLET ORAL DAILY
Qty: 60 TABLET | Refills: 0 | Status: SHIPPED | OUTPATIENT
Start: 2021-05-20

## 2021-05-19 RX ORDER — VENETOCLAX 100 MG/1
200 TABLET, FILM COATED ORAL
Refills: 0 | Status: SHIPPED | COMMUNITY
Start: 2021-05-19

## 2021-05-19 NOTE — PROGRESS NOTES
All discharge instructions discussed with patient and son at bedside. Patient refuses drsg change to [facility] stating they use an allergy kit/ certain type of dressing for her as she is sensitive. Rn paged our vascular access team here to see if have.  No return

## 2021-05-19 NOTE — PLAN OF CARE
Plan of care discussed with patient and son Cheryl Tolbert via phone this am in patient room. Son Cheryl Tolbert asking to speak with GI MD prior to discharge. Rn paged Dr Terrell Rashid to notify of this. Awaiting call back.  Dr Moe Steven said he attempted to call jacky Tolbert this am,

## 2021-05-19 NOTE — DISCHARGE SUMMARY
[facility]  DISCHARGE SUMMARY     Juju Chowdhury Patient Status:  Observation    2/10/1955 MRN [id number]   [facility] 3SW-A Attending Ann-Marie De La Fuente MD   Hosp Day # 0 PCP Bahman Christian     Date of Admission: 2021  Date of Dischar Patient was started on IV abx for UTI. Paracentesis done (-1.6L), sample not consistent with SBP. Diuretics adjusted (Lasix 20/day and Janae Ruths 50/day). UC with Ecoli. Abdominal pain resolved. Patient doing well. Home today on oral abx, diuretics as below.  Lisa King by mouth every 8 (eight) hours as needed for Nausea. Refills: 0     Promacta 25 MG Tabs  Generic drug: Eltrombopag Olamine      Take by mouth daily. Refills: 0     spironolactone 50 MG Tabs  Commonly known as: ALDACTONE      Take 50 mg by mouth daily.

## 2021-05-19 NOTE — PLAN OF CARE
Pt denies any pain or discomfort. Afebrile, BP 95/49. Tolerating Rocephin. States no dizziness or feeling lightheaded when up. Ambulating independently, calls staff for assistance. Voiding without difficulty.  Plan is dc home with [facility] when cleared by all ser

## 2021-05-20 ENCOUNTER — PATIENT OUTREACH (OUTPATIENT)
Dept: CASE MANAGEMENT | Age: 66
End: 2021-05-20

## 2021-05-20 NOTE — PROGRESS NOTES
1st attempt to contact patient to assist in sched hosp fup:spoke w/pt son Ayah Brody and will call him back w/appt for PCP    Eugene Bernstein on 5/27 @11:00am Family Practice In 1 week  [address], [address] [address]   [address] [place] [address]

## 2021-06-18 ENCOUNTER — APPOINTMENT (OUTPATIENT)
Dept: CT IMAGING | Facility: HOSPITAL | Age: 66
DRG: 442 | End: 2021-06-18
Attending: EMERGENCY MEDICINE
Payer: MEDICARE

## 2021-06-18 ENCOUNTER — HOSPITAL ENCOUNTER (INPATIENT)
Facility: HOSPITAL | Age: 66
LOS: 2 days | Discharge: HOME | DRG: 442 | End: 2021-06-20
Attending: EMERGENCY MEDICINE | Admitting: INTERNAL MEDICINE
Payer: MEDICARE

## 2021-06-18 ENCOUNTER — APPOINTMENT (OUTPATIENT)
Dept: GENERAL RADIOLOGY | Facility: HOSPITAL | Age: 66
DRG: 442 | End: 2021-06-18
Attending: EMERGENCY MEDICINE
Payer: MEDICARE

## 2021-06-18 DIAGNOSIS — K72.90 HEPATIC ENCEPHALOPATHY (HCC): Primary | ICD-10-CM

## 2021-06-18 PROBLEM — K76.82 HEPATIC ENCEPHALOPATHY: Status: ACTIVE | Noted: 2021-06-18

## 2021-06-18 PROBLEM — K76.82 HEPATIC ENCEPHALOPATHY (HCC): Status: ACTIVE | Noted: 2021-06-18

## 2021-06-18 PROCEDURE — 70450 CT HEAD/BRAIN W/O DYE: CPT | Performed by: EMERGENCY MEDICINE

## 2021-06-18 PROCEDURE — 71045 X-RAY EXAM CHEST 1 VIEW: CPT | Performed by: EMERGENCY MEDICINE

## 2021-06-18 PROCEDURE — 99223 1ST HOSP IP/OBS HIGH 75: CPT | Performed by: INTERNAL MEDICINE

## 2021-06-18 RX ORDER — METOCLOPRAMIDE HYDROCHLORIDE 5 MG/ML
10 INJECTION INTRAMUSCULAR; INTRAVENOUS EVERY 8 HOURS PRN
Status: DISCONTINUED | OUTPATIENT
Start: 2021-06-18 | End: 2021-06-20

## 2021-06-18 RX ORDER — SODIUM CHLORIDE 9 MG/ML
125 INJECTION, SOLUTION INTRAVENOUS CONTINUOUS
Status: DISCONTINUED | OUTPATIENT
Start: 2021-06-18 | End: 2021-06-20

## 2021-06-18 RX ORDER — ACYCLOVIR 400 MG/1
400 TABLET ORAL 2 TIMES DAILY
Status: DISCONTINUED | OUTPATIENT
Start: 2021-06-18 | End: 2021-06-20

## 2021-06-18 RX ORDER — ONDANSETRON 2 MG/ML
4 INJECTION INTRAMUSCULAR; INTRAVENOUS EVERY 6 HOURS PRN
Status: DISCONTINUED | OUTPATIENT
Start: 2021-06-18 | End: 2021-06-20

## 2021-06-18 RX ORDER — FUROSEMIDE 20 MG/1
20 TABLET ORAL DAILY
Status: DISCONTINUED | OUTPATIENT
Start: 2021-06-19 | End: 2021-06-20

## 2021-06-18 RX ORDER — LACTULOSE 10 G/15ML
30 SOLUTION ORAL 3 TIMES DAILY
Status: DISCONTINUED | OUTPATIENT
Start: 2021-06-19 | End: 2021-06-20

## 2021-06-18 RX ORDER — ENOXAPARIN SODIUM 100 MG/ML
40 INJECTION SUBCUTANEOUS NIGHTLY
Status: DISCONTINUED | OUTPATIENT
Start: 2021-06-18 | End: 2021-06-20

## 2021-06-18 RX ORDER — PANTOPRAZOLE SODIUM 40 MG/1
40 TABLET, DELAYED RELEASE ORAL
Status: DISCONTINUED | OUTPATIENT
Start: 2021-06-19 | End: 2021-06-20

## 2021-06-18 RX ORDER — GABAPENTIN 100 MG/1
200 CAPSULE ORAL NIGHTLY
Status: DISCONTINUED | OUTPATIENT
Start: 2021-06-18 | End: 2021-06-20

## 2021-06-18 RX ORDER — URSODIOL 300 MG/1
300 CAPSULE ORAL 2 TIMES DAILY
Status: DISCONTINUED | OUTPATIENT
Start: 2021-06-18 | End: 2021-06-20

## 2021-06-18 RX ORDER — LACTULOSE 20 G/30ML
30 SOLUTION ORAL ONCE
Status: COMPLETED | OUTPATIENT
Start: 2021-06-18 | End: 2021-06-18

## 2021-06-18 RX ORDER — SULFAMETHOXAZOLE AND TRIMETHOPRIM 800; 160 MG/1; MG/1
1 TABLET ORAL 2 TIMES DAILY
Status: DISCONTINUED | OUTPATIENT
Start: 2021-06-18 | End: 2021-06-20

## 2021-06-18 NOTE — ED INITIAL ASSESSMENT (HPI)
Per pts son, pt woke up this AM disoriented, c/o tingling in bilateral legs. Per pts son, pt still seems disoriented, continues to repeat things. Pts son states that she might be dehydrated. Pt has hx of CA.

## 2021-06-18 NOTE — H&P
[facility]  History and Physical     Ludger Slot Patient Status:  Inpatient    2/10/1955 MRN [id number]   [facility] 4NW-A Attending Cristian Meza MD   Hosp Day # 0 PCP Jomarie Siemens     Chief Complaint: AMS    History of Present MG Oral Tab, Take 400 mg by mouth 2 (two) times daily. , Disp: , Rfl:   ursodiol 300 MG Oral Cap, Take 300 mg by mouth 2 (two) times daily. , Disp: , Rfl:   gabapentin 100 MG Oral Cap, Take 200 mg by mouth nightly., Disp: , Rfl:   Sulfamethoxazole-TMP  deficits. CNII-XII grossly intact. Musculoskeletal: Moves all extremities. Extremities: No edema or cyanosis. Integument: No rashes or lesions. Psychiatric: Appropriate mood and affect.       Diagnostic Data:      Labs:  Recent Labs   Lab 06/18/21  151 MD  9/99/3207          **Certification      PHYSICIAN Certification of Need for Inpatient Hospitalization - Initial Certification    Patient will require inpatient services that will reasonably be expected to span two midnight's based on the clinical docum

## 2021-06-18 NOTE — PLAN OF CARE
NURSING ADMISSION NOTE      Patient admitted via cart from ER. Accompanied by her son. Oriented to room. Safety precautions initiated. Bed in low position. Call light in reach. Admission Navigator completed w/ sons assistance. Pt. Denies pain.  VS

## 2021-06-19 PROCEDURE — 99232 SBSQ HOSP IP/OBS MODERATE 35: CPT | Performed by: INTERNAL MEDICINE

## 2021-06-19 RX ORDER — SPIRONOLACTONE 25 MG/1
50 TABLET ORAL DAILY
Status: DISCONTINUED | OUTPATIENT
Start: 2021-06-19 | End: 2021-06-20

## 2021-06-19 NOTE — PROGRESS NOTES
Pt AOX3, disoriented to situation. No complaints of pain. Pt home meds ordered and given per MAR.  Spoke with pt son Gail Almazan regarding promacta and venclexa and let him know we do not carry them here and asked if he could bring them from home for pharmacy daly

## 2021-06-19 NOTE — PLAN OF CARE
Pt is aaox3-4, denies pain, generalized jaundice, with loose BM, on lactulose scheduled, today's ammonia 91, ivf infusing.   Problem: Patient/Family Goals  Goal: Patient/Family Long Term Goal  Description: Patient's Long Term Goal:    Interventions:  -   - tolerated, if ordered  - Obtain nutritional consult as needed  - Evaluate fluid balance  Outcome: Progressing  Goal: Maintains or returns to baseline bowel function  Description: INTERVENTIONS:  - Assess bowel function  - Maintain adequate hydration with I parameters to optimize cerebral perfusion and minimize risk of hemorrhage  - Monitor temperature, glucose, and sodium.  Initiate appropriate interventions as ordered  Outcome: Progressing

## 2021-06-19 NOTE — PROGRESS NOTES
[facility]  Progress Note     Nasir Tres Patient Status:  Inpatient    2/10/1955 MRN [id number]   [facility] 4NW-A Attending Mike Bradshaw MD   Hosp Day # 1 PCP [facility] KARY     Chief Complaint: AMS    S: Patient doing so much bet COVID19 Not Detected 05/16/2021       Pro-Calcitonin  No results for input(s): PCT in the last 168 hours. Cardiac  No results for input(s): TROP, PBNP in the last 168 hours. Creatinine Kinase  No results for input(s): CK in the last 168 hours.     Inf is expected to be discharge to: home    Plan of care discussed with pt, RN, spouse/ son.      Yasmani Reid MD

## 2021-06-20 VITALS
OXYGEN SATURATION: 99 % | DIASTOLIC BLOOD PRESSURE: 65 MMHG | TEMPERATURE: 98 F | HEIGHT: 65 IN | SYSTOLIC BLOOD PRESSURE: 122 MMHG | WEIGHT: 121.5 LBS | HEART RATE: 91 BPM | RESPIRATION RATE: 20 BRPM | BODY MASS INDEX: 20.24 KG/M2

## 2021-06-20 PROCEDURE — 99238 HOSP IP/OBS DSCHRG MGMT 30/<: CPT | Performed by: INTERNAL MEDICINE

## 2021-06-20 RX ORDER — LACTULOSE 20 G/30ML
20 SOLUTION ORAL 3 TIMES DAILY
Qty: 2700 ML | Refills: 0 | Status: SHIPPED | OUTPATIENT
Start: 2021-06-20 | End: 2021-07-20

## 2021-06-20 NOTE — PROGRESS NOTES
Pt AOX4 with no complaints of pain. Pt does report some bloating and gas. Pt with approximately 8 BM during the day and this evening. Pt reports good appetite. IVF infusing per MAR. Pt ambulatory with steady gait.  Spoke with daughter on speaker with pt and

## 2021-06-20 NOTE — DISCHARGE SUMMARY
[facility] HOSPITALIST  DISCHARGE SUMMARY     Oskar Oneil Patient Status:  Inpatient    2/10/1955 MRN [id number]   [facility] 4NW-A Attending Shaniqua Quintanilla MD   Hosp Day # 2 [facility]     Date of Admission: 2021  Date of Discharge details   lactulose 20 GM/30ML Soln  Commonly known as: ENULOSE  Notes to patient: Hold if you are having 3 stools a day. Take 30 mL (20 g total) by mouth 3 (three) times daily.    Stop taking on: July 20, 2021  Quantity: 2700 mL  Refills: 0        CON NA    Follow-up appointment:   your GI TEAM     Schedule an appointment as soon as possible for a visit in 1 week      Appointments for Next 30 Days 6/20/2021 - 7/20/2021    None          Vital signs:  Temp:  [97.9 °F (36.6 °C)-98.1 °F (36.7 °C)] 98.1 °F (

## 2021-06-20 NOTE — PROGRESS NOTES
NURSING DISCHARGE NOTE    Discharged Home via Wheelchair.   Accompanied by Family member and Support staff  Belongings Taken by patient/family   Pt is aaox4 but forgetful, had multiple loose stool per pt, pt is on lactulose scheduled, dc instructions gi

## 2021-06-20 NOTE — PHYSICAL THERAPY NOTE
PHYSICAL THERAPY QUICK EVALUATION - INPATIENT    Room Number: 422/422-A  Evaluation Date: 6/20/2021  Presenting Problem: Hepatic encephalopathy   Physician Order: PT Eval and Treat    History related to current admission: Patient is a 74year old female translate)  Fall Risk: Standard fall risk    WEIGHT BEARING RESTRICTION  Weight Bearing Restriction: None                PAIN ASSESSMENT  Ratin         RANGE OF MOTION AND STRENGTH ASSESSMENT  Upper extremity ROM and strength are within functional limi prevention, activity recommendation, and discharge planning. Pt performed supine to sit transfer independently with HOB flat. Pt reported mild dizziness upon sitting, BP checked and stable. Pt doffed and donned both socks independently sitting EOB.  Pt perf

## 2022-01-13 ENCOUNTER — APPOINTMENT (OUTPATIENT)
Dept: CT IMAGING | Facility: HOSPITAL | Age: 67
End: 2022-01-13
Attending: EMERGENCY MEDICINE
Payer: MEDICARE

## 2022-01-13 ENCOUNTER — HOSPITAL ENCOUNTER (EMERGENCY)
Facility: HOSPITAL | Age: 67
Discharge: HOME | End: 2022-01-13
Attending: EMERGENCY MEDICINE
Payer: MEDICARE

## 2022-01-13 VITALS
RESPIRATION RATE: 17 BRPM | WEIGHT: 119 LBS | HEART RATE: 86 BPM | HEIGHT: 64 IN | OXYGEN SATURATION: 97 % | BODY MASS INDEX: 20.32 KG/M2 | DIASTOLIC BLOOD PRESSURE: 63 MMHG | TEMPERATURE: 98 F | SYSTOLIC BLOOD PRESSURE: 106 MMHG

## 2022-01-13 DIAGNOSIS — R05.9 COUGH: ICD-10-CM

## 2022-01-13 DIAGNOSIS — C95.90 LEUKEMIA NOT HAVING ACHIEVED REMISSION, UNSPECIFIED LEUKEMIA TYPE (HCC): ICD-10-CM

## 2022-01-13 DIAGNOSIS — U07.1 PNEUMONIA DUE TO COVID-19 VIRUS: Primary | ICD-10-CM

## 2022-01-13 DIAGNOSIS — S32.010A CLOSED COMPRESSION FRACTURE OF BODY OF L1 VERTEBRA (HCC): ICD-10-CM

## 2022-01-13 DIAGNOSIS — J12.82 PNEUMONIA DUE TO COVID-19 VIRUS: Primary | ICD-10-CM

## 2022-01-13 LAB
ALBUMIN SERPL-MCNC: 3.7 G/DL (ref 3.4–5)
ALBUMIN/GLOB SERPL: 1.2 {RATIO} (ref 1–2)
ALP LIVER SERPL-CCNC: 217 U/L
ALT SERPL-CCNC: 29 U/L
ANION GAP SERPL CALC-SCNC: 6 MMOL/L (ref 0–18)
AST SERPL-CCNC: 30 U/L (ref 15–37)
ATRIAL RATE: 63 BPM
BASOPHILS # BLD AUTO: 0.02 X10(3) UL (ref 0–0.2)
BASOPHILS NFR BLD AUTO: 0.5 %
BILIRUB SERPL-MCNC: 1.4 MG/DL (ref 0.1–2)
BUN BLD-MCNC: 25 MG/DL (ref 7–18)
CALCIUM BLD-MCNC: 9.3 MG/DL (ref 8.5–10.1)
CHLORIDE SERPL-SCNC: 108 MMOL/L (ref 98–112)
CO2 SERPL-SCNC: 26 MMOL/L (ref 21–32)
CREAT BLD-MCNC: 0.99 MG/DL
D DIMER PPP FEU-MCNC: 1.59 UG/ML FEU (ref ?–0.68)
EOSINOPHIL # BLD AUTO: 0.07 X10(3) UL (ref 0–0.7)
EOSINOPHIL NFR BLD AUTO: 1.6 %
ERYTHROCYTE [DISTWIDTH] IN BLOOD BY AUTOMATED COUNT: 12.9 %
GLOBULIN PLAS-MCNC: 3.2 G/DL (ref 2.8–4.4)
GLUCOSE BLD-MCNC: 94 MG/DL (ref 70–99)
HCT VFR BLD AUTO: 42.1 %
HGB BLD-MCNC: 14.3 G/DL
IMM GRANULOCYTES # BLD AUTO: 0.01 X10(3) UL (ref 0–1)
IMM GRANULOCYTES NFR BLD: 0.2 %
LYMPHOCYTES # BLD AUTO: 1.76 X10(3) UL (ref 1–4)
LYMPHOCYTES NFR BLD AUTO: 40 %
MCH RBC QN AUTO: 36.6 PG (ref 26–34)
MCHC RBC AUTO-ENTMCNC: 34 G/DL (ref 31–37)
MCV RBC AUTO: 107.7 FL
MONOCYTES # BLD AUTO: 0.6 X10(3) UL (ref 0.1–1)
MONOCYTES NFR BLD AUTO: 13.6 %
NEUTROPHILS # BLD AUTO: 1.94 X10 (3) UL (ref 1.5–7.7)
NEUTROPHILS # BLD AUTO: 1.94 X10(3) UL (ref 1.5–7.7)
NEUTROPHILS NFR BLD AUTO: 44.1 %
NT-PROBNP SERPL-MCNC: 167 PG/ML (ref ?–125)
OSMOLALITY SERPL CALC.SUM OF ELEC: 294 MOSM/KG (ref 275–295)
P AXIS: 69 DEGREES
P-R INTERVAL: 154 MS
PLATELET # BLD AUTO: 92 10(3)UL (ref 150–450)
POTASSIUM SERPL-SCNC: 4.2 MMOL/L (ref 3.5–5.1)
PROT SERPL-MCNC: 6.9 G/DL (ref 6.4–8.2)
Q-T INTERVAL: 394 MS
QRS DURATION: 74 MS
QTC CALCULATION (BEZET): 403 MS
R AXIS: 47 DEGREES
RBC # BLD AUTO: 3.91 X10(6)UL
SODIUM SERPL-SCNC: 140 MMOL/L (ref 136–145)
T AXIS: 60 DEGREES
TROPONIN I HIGH SENSITIVITY: 3 NG/L
VENTRICULAR RATE: 63 BPM
WBC # BLD AUTO: 4.4 X10(3) UL (ref 4–11)

## 2022-01-13 PROCEDURE — 80053 COMPREHEN METABOLIC PANEL: CPT | Performed by: EMERGENCY MEDICINE

## 2022-01-13 PROCEDURE — 83880 ASSAY OF NATRIURETIC PEPTIDE: CPT | Performed by: EMERGENCY MEDICINE

## 2022-01-13 PROCEDURE — 99285 EMERGENCY DEPT VISIT HI MDM: CPT

## 2022-01-13 PROCEDURE — 96361 HYDRATE IV INFUSION ADD-ON: CPT

## 2022-01-13 PROCEDURE — 93010 ELECTROCARDIOGRAM REPORT: CPT

## 2022-01-13 PROCEDURE — 84484 ASSAY OF TROPONIN QUANT: CPT | Performed by: EMERGENCY MEDICINE

## 2022-01-13 PROCEDURE — 71275 CT ANGIOGRAPHY CHEST: CPT | Performed by: EMERGENCY MEDICINE

## 2022-01-13 PROCEDURE — 96360 HYDRATION IV INFUSION INIT: CPT

## 2022-01-13 PROCEDURE — 85379 FIBRIN DEGRADATION QUANT: CPT | Performed by: EMERGENCY MEDICINE

## 2022-01-13 PROCEDURE — 93005 ELECTROCARDIOGRAM TRACING: CPT

## 2022-01-13 PROCEDURE — 85025 COMPLETE CBC W/AUTO DIFF WBC: CPT | Performed by: EMERGENCY MEDICINE

## 2022-01-13 NOTE — ED INITIAL ASSESSMENT (HPI)
Hx of leukemia, covid positive 2 weeks ago, reports SOB, headache, and cough. No fever or chest pain. 100% RA. saw PCP 2 days ago, sent here for chest xray and to r/o PE, hx of DVT 1 year ago. No thinners.

## 2022-01-13 NOTE — ED PROVIDER NOTES
Patient Seen in: [facility] Emergency Department      History   Patient presents with:  Covid  Dizziness  Difficulty Breathing    Stated Complaint: + covid; dizziness and sob.      Subjective:   68-year-old female, history of B-cell leukemia, multipl Negative for back pain. Skin: Negative for rash. Allergic/Immunologic: Positive for immunocompromised state. Neurological: Negative for dizziness, weakness and headaches. Hematological: Does not bruise/bleed easily.    Psychiatric/Behavioral: Francine Perez deficit.    Psychiatric:         Mood and Affect: Mood normal.         Behavior: Behavior normal.             ED Course     Labs Reviewed   COMP METABOLIC PANEL (14) - Abnormal; Notable for the following components:       Result Value    BUN 25 (*)     GFR, site.  If needed a follow-up MRI lumbar spine exam may be done.     Dictated by (CST): Татьяна Lester MD on 1/13/2022 at 4:57 PM     Finalized by (CST): Татьяна Lester MD on 1/13/2022 at 4:59 PM           68-year-old female sent over by her physicia

## 2022-04-14 ENCOUNTER — HOSPITAL ENCOUNTER (OUTPATIENT)
Dept: GENERAL RADIOLOGY | Facility: HOSPITAL | Age: 67
Discharge: HOME | End: 2022-04-14
Attending: FAMILY MEDICINE
Payer: MEDICARE

## 2022-04-14 DIAGNOSIS — J18.9 PNEUMONIA OF BOTH LOWER LOBES: ICD-10-CM

## 2022-04-14 PROCEDURE — 71046 X-RAY EXAM CHEST 2 VIEWS: CPT | Performed by: FAMILY MEDICINE

## 2023-01-16 ENCOUNTER — LAB ENCOUNTER (OUTPATIENT)
Dept: LAB | Facility: HOSPITAL | Age: 68
End: 2023-01-16
Attending: FAMILY MEDICINE
Payer: MEDICARE

## 2023-01-16 DIAGNOSIS — R19.7 DIARRHEA OF PRESUMED INFECTIOUS ORIGIN: Primary | ICD-10-CM

## 2023-01-16 PROCEDURE — 87045 FECES CULTURE AEROBIC BACT: CPT

## 2023-01-16 PROCEDURE — 87077 CULTURE AEROBIC IDENTIFY: CPT

## 2023-01-16 PROCEDURE — 87046 STOOL CULTR AEROBIC BACT EA: CPT

## 2023-04-03 ENCOUNTER — LAB ENCOUNTER (OUTPATIENT)
Dept: LAB | Facility: HOSPITAL | Age: 68
End: 2023-04-03
Attending: FAMILY MEDICINE
Payer: MEDICARE

## 2023-04-03 DIAGNOSIS — R19.7 DIARRHEA: Primary | ICD-10-CM

## 2023-04-03 PROCEDURE — 87427 SHIGA-LIKE TOXIN AG IA: CPT

## 2023-04-03 PROCEDURE — 87046 STOOL CULTR AEROBIC BACT EA: CPT

## 2023-04-03 PROCEDURE — 87045 FECES CULTURE AEROBIC BACT: CPT

## 2023-12-09 ENCOUNTER — HOSPITAL ENCOUNTER (EMERGENCY)
Facility: HOSPITAL | Age: 68
Discharge: HOME | End: 2023-12-09
Attending: EMERGENCY MEDICINE
Payer: MEDICARE

## 2023-12-09 ENCOUNTER — TELEPHONE (OUTPATIENT)
Dept: CASE MANAGEMENT | Facility: HOSPITAL | Age: 68
End: 2023-12-09

## 2023-12-09 VITALS
DIASTOLIC BLOOD PRESSURE: 68 MMHG | HEIGHT: 64 IN | WEIGHT: 120 LBS | TEMPERATURE: 98 F | RESPIRATION RATE: 16 BRPM | BODY MASS INDEX: 20.49 KG/M2 | OXYGEN SATURATION: 100 % | SYSTOLIC BLOOD PRESSURE: 129 MMHG | HEART RATE: 64 BPM

## 2023-12-09 DIAGNOSIS — B02.9 HERPES ZOSTER WITHOUT COMPLICATION: Primary | ICD-10-CM

## 2023-12-09 PROCEDURE — 99283 EMERGENCY DEPT VISIT LOW MDM: CPT

## 2023-12-09 RX ORDER — GABAPENTIN 100 MG/1
100 CAPSULE ORAL 3 TIMES DAILY
Qty: 90 CAPSULE | Refills: 0 | Status: SHIPPED | OUTPATIENT
Start: 2023-12-09 | End: 2024-01-08

## 2023-12-09 RX ORDER — HYDROCODONE BITARTRATE AND ACETAMINOPHEN 5; 325 MG/1; MG/1
1 TABLET ORAL ONCE
Status: COMPLETED | OUTPATIENT
Start: 2023-12-09 | End: 2023-12-09

## 2023-12-09 NOTE — CM/SW NOTE
RX was sent to patient's old pharmacy in [address] [facility] updated in [facility]  MD khan SM

## 2023-12-09 NOTE — ED PROVIDER NOTES
Patient Seen in: [facility] Emergency Department      History     Chief Complaint   Patient presents with    Abdomen/Flank Pain     Stated Complaint: sharp pain in left lower abd going to back and down leg starting 3 weeks ago    Subjective:   HPI    26-year-old female comes to the hospital complaint of having difficulty with continued pain from her left lower back buttocks radiating down her left leg. Started 3 weeks ago. She was diagnosed with shingles at the time and has been through a course of valacyclovir and has been on some steroid creams. She does chronically take gabapentin as well. She still complain of having discomfort. Denies any fevers or chills. There is no pus or drainage. She denying any other numbness or weakness. She is no other complaints. Objective:   Past Medical History:   Diagnosis Date    Acute transitional pre-B-cell lymphoblastic leukemia ([facility] [address]     Atrial thrombus 07/01/2012    Multiple myeloma ([facility] [address] 2012    Raynaud disease     Renal failure     Unspecified essential hypertension               Past Surgical History:   Procedure Laterality Date    APPENDECTOMY      BONE MARROW TRANSPLANT      BREAST BIOPSY      multiple, left breast    CATARACT      September 2014    CHOLECYSTECTOMY      HYSTERECTOMY  1999    LUIS MIGUEL LOCALIZATION WIRE 1 SITE LEFT (CPT=19281)  1998    exc bx, benign    LUIS MIGUEL LOCALIZATION WIRE 1 SITE RIGHT (CPT=19281)  1971    benign node excised    OOPHORECTOMY Bilateral 1999    TONSILLECTOMY                  Social History     Socioeconomic History    Marital status:    Tobacco Use    Smoking status: Never    Smokeless tobacco: Never   Vaping Use    Vaping Use: Never used   Substance and Sexual Activity    Alcohol use: No    Drug use: No              Review of Systems    Positive for stated complaint: sharp pain in left lower abd going to back and down leg starting 3 weeks ago  Other systems are as noted in HPI. Constitutional and vital signs reviewed. All other systems reviewed and negative except as noted above. Physical Exam     ED Triage Vitals   BP    Pulse    Resp    Temp    Temp src    SpO2    O2 Device        Current:There were no vitals taken for this visit. Physical Exam    HEENT; NCAT, EOMI, throat clear, neck supple, no LAD, no JVD  Heart S1S2 RRR  lungs: CTAB  abd: Soft NT, ND,  NABS without rebound or guarding  Ext no C/C/E  Integument Patch of healing wounds with some excoriations noted by her left buttocks down her left thigh without secondary infection  ED Course     Labs Reviewed   RAINBOW DRAW LAVENDER   RAINBOW DRAW LIGHT GREEN   RAINBOW DRAW BLUE   RAINBOW DRAW GOLD             The patient was given Norco for pain. In addition to this we will increase her gabapentin dosage and have her follow-up         MDM      Differential diagnosis does include back strain but not limited to such. Patient's symptoms are consistent with neuropathic pain secondary to shingles. Medical Decision Making      Disposition and Plan     Clinical Impression:  1. Herpes zoster without complication         Disposition:  Discharge  12/9/2023 10:37 am    Follow-up:  Deysi Bonilla  [address] [place] [id number] [phone]    Schedule an appointment as soon as possible for a visit in 3 day(s)            Medications Prescribed:  Current Discharge Medication List        START taking these medications    Details   !! gabapentin 100 MG Oral Cap Take 1 capsule (100 mg total) by mouth 3 (three) times daily. Qty: 90 capsule, Refills: 0       !! - Potential duplicate medications found. Please discuss with provider.

## 2025-05-01 NOTE — PROGRESS NOTES
[facility] Gastroenterology Progress Note    CC: ascites, cirrhosis    S: Pt with resolved abd pain today, and with improved distention     O: /66 (BP Location: Left arm)   Pulse 87   Temp 98.2 °F (36.8 °C) (Oral)   Resp 18   Ht 5' 3\" (1.6 m) [facility] Post-Op Phone Call                     Surgeon: Bernard Oden    Date of Surgery: 4/28/25  Surgery: Laparoscopic Cholecystectomy  Discharge Date: 4/28/25    Upon chart review, patient has a post of appointment scheduled with GF per patient request.        Call completed by: Sherice Hdz RN

## (undated) NOTE — MR AVS SNAPSHOT
After Visit Summary   3/10/2017    Isreal Nazario    MRN: [id number]           Diagnoses this Visit     Multiple myeloma in remission [facility])    -  Primary       Allergies     Shrimp Hives, Swelling, Shortness of Breath      Your Vital Signs Were     BP

## (undated) NOTE — MR AVS SNAPSHOT
After Visit Summary   1/23/2017    Smiley Juan Antonio    MRN: [id number]           Diagnoses this Visit     Multiple myeloma in remission [facility])    -  Primary       Allergies     Shrimp Hives, Swelling, Shortness of Breath      Your Vital Signs Were     BP Procedure                               Abnormality         Status                     ---------                               -----------         ------                     CBC W/ DIFFERENTIAL[[id number]]          Abnormal            Final result not sign up before the expiration date, you must request a new code. Your unique [employer] Access Code: [email or web address]  Expires: 3/24/2017 12:28 PM    If you have questions, you can call [phone] to talk to our [facility] Staff.  Remember, [employer]

## (undated) NOTE — MR AVS SNAPSHOT
[facility], [address], [address] [address] [phone]               Thank you for choosing us for your health care visit with Karen Hernandez MD.  We are glad to serve you and happy to provide you with this Glucosamine Sulfate 1000 MG Caps   Take 2,000 mg by mouth daily. Lenalidomide 5 MG Caps   Take 5 mg by mouth daily. Loperamide HCl 2 MG Caps   Take 2 mg by mouth 4 (four) times daily as needed for Diarrhea.    Commonly known as:  FRANK